# Patient Record
Sex: MALE | Race: WHITE | NOT HISPANIC OR LATINO | URBAN - METROPOLITAN AREA
[De-identification: names, ages, dates, MRNs, and addresses within clinical notes are randomized per-mention and may not be internally consistent; named-entity substitution may affect disease eponyms.]

---

## 2017-06-27 ENCOUNTER — IMPORTED ENCOUNTER (OUTPATIENT)
Dept: URBAN - METROPOLITAN AREA CLINIC 50 | Facility: CLINIC | Age: 77
End: 2017-06-27

## 2018-06-28 ENCOUNTER — IMPORTED ENCOUNTER (OUTPATIENT)
Dept: URBAN - METROPOLITAN AREA CLINIC 50 | Facility: CLINIC | Age: 78
End: 2018-06-28

## 2019-05-30 NOTE — PROCEDURE NOTE: CLINICAL
PROCEDURE NOTE: Laser for Retinal Tear #1 OS. Diagnosis: Operculated Tear. Anesthesia: Topical. Prior to laser, risks/benefits/alternatives to laser discussed including loss of vision, decreased peripheral and night vision, need for more laser and/or surgery and patient wished to proceed. An informed consent was obtained and no assurances or guarantees were given. Spot size: 200* um. Pulse power: * 200mW. Pulse duration: *100 ms. Number of pulses: 186*. Procedure Time: 9:41*. Patient tolerated procedure well. There were no complications. Post-op instructions given. Patient given office phone number/answering service number and advised to call immediately should there be loss of vision or pain, or should they have any other questions or concerns. Yajaira Ramirez

## 2019-07-11 ENCOUNTER — IMPORTED ENCOUNTER (OUTPATIENT)
Dept: URBAN - METROPOLITAN AREA CLINIC 50 | Facility: CLINIC | Age: 79
End: 2019-07-11

## 2020-11-19 ENCOUNTER — IMPORTED ENCOUNTER (OUTPATIENT)
Dept: URBAN - METROPOLITAN AREA CLINIC 50 | Facility: CLINIC | Age: 80
End: 2020-11-19

## 2021-04-17 ASSESSMENT — TONOMETRY
OS_IOP_MMHG: 13
OS_IOP_MMHG: 12
OD_IOP_MMHG: 12
OD_IOP_MMHG: 13
OD_IOP_MMHG: 12
OS_IOP_MMHG: 12
OS_IOP_MMHG: 15
OD_IOP_MMHG: 14

## 2021-04-17 ASSESSMENT — VISUAL ACUITY
OS_CC: J1@ 15 IN
OD_PH: 20/40
OS_SC: 20/30-1
OS_SC: 20/30
OS_CC: J1@ 16 IN
OD_CC: J1@ 15 IN
OD_PH: @ 15 IN
OD_SC: 20/40-1
OS_CC: J1+ -1
OD_OTHER: 20/60. 20/100.
OS_PH: 20/25
OS_SC: 20/25-1
OD_CC: J1+ -1
OD_BAT: 20/60
OD_PH: 20/30
OS_CC: J1+
OD_CC: J1+
OS_BAT: 20/40
OS_PH: @ 15 IN
OD_SC: 20/40
OD_SC: 20/50
OD_CC: J1@ 16 IN
OS_SC: 20/30
OS_PH: 20/30+
OD_SC: 20/100
OS_OTHER: 20/40. 20/400.

## 2021-11-05 ENCOUNTER — PREPPED CHART (OUTPATIENT)
Dept: URBAN - METROPOLITAN AREA CLINIC 48 | Facility: CLINIC | Age: 81
End: 2021-11-05

## 2021-11-10 ENCOUNTER — ANNUAL COMPREHENSIVE EXAM (OUTPATIENT)
Dept: URBAN - METROPOLITAN AREA CLINIC 48 | Facility: CLINIC | Age: 81
End: 2021-11-10

## 2021-11-10 DIAGNOSIS — H04.123: ICD-10-CM

## 2021-11-10 DIAGNOSIS — H26.493: ICD-10-CM

## 2021-11-10 DIAGNOSIS — E11.9: ICD-10-CM

## 2021-11-10 DIAGNOSIS — H35.3232: ICD-10-CM

## 2021-11-10 DIAGNOSIS — H35.373: ICD-10-CM

## 2021-11-10 DIAGNOSIS — H43.811: ICD-10-CM

## 2021-11-10 PROCEDURE — 92134 CPTRZ OPH DX IMG PST SGM RTA: CPT

## 2021-11-10 PROCEDURE — 92014 COMPRE OPH EXAM EST PT 1/>: CPT

## 2021-11-10 ASSESSMENT — TONOMETRY
OD_IOP_MMHG: 14
OS_IOP_MMHG: 14

## 2021-11-10 ASSESSMENT — VISUAL ACUITY
OS_GLARE: 20/40
OS_GLARE: 20/50
OD_SC: 20/100
OD_GLARE: 20/80
OS_PH: 20/30
OD_PH: 20/40
OD_GLARE: 20/400
OU_SC: J1
OS_SC: 20/30-2